# Patient Record
Sex: FEMALE | Race: BLACK OR AFRICAN AMERICAN | NOT HISPANIC OR LATINO | URBAN - METROPOLITAN AREA
[De-identification: names, ages, dates, MRNs, and addresses within clinical notes are randomized per-mention and may not be internally consistent; named-entity substitution may affect disease eponyms.]

---

## 2019-03-11 ENCOUNTER — INPATIENT (INPATIENT)
Facility: HOSPITAL | Age: 29
LOS: 0 days | Discharge: HOME | DRG: 812 | End: 2019-03-12
Attending: STUDENT IN AN ORGANIZED HEALTH CARE EDUCATION/TRAINING PROGRAM | Admitting: STUDENT IN AN ORGANIZED HEALTH CARE EDUCATION/TRAINING PROGRAM
Payer: COMMERCIAL

## 2019-03-11 VITALS
OXYGEN SATURATION: 98 % | HEART RATE: 102 BPM | TEMPERATURE: 98 F | SYSTOLIC BLOOD PRESSURE: 135 MMHG | RESPIRATION RATE: 18 BRPM | DIASTOLIC BLOOD PRESSURE: 84 MMHG

## 2019-03-11 DIAGNOSIS — Z91.89 OTHER SPECIFIED PERSONAL RISK FACTORS, NOT ELSEWHERE CLASSIFIED: ICD-10-CM

## 2019-03-11 DIAGNOSIS — D57.00 HB-SS DISEASE WITH CRISIS, UNSPECIFIED: ICD-10-CM

## 2019-03-11 DIAGNOSIS — Z29.9 ENCOUNTER FOR PROPHYLACTIC MEASURES, UNSPECIFIED: ICD-10-CM

## 2019-03-11 DIAGNOSIS — Z90.49 ACQUIRED ABSENCE OF OTHER SPECIFIED PARTS OF DIGESTIVE TRACT: Chronic | ICD-10-CM

## 2019-03-11 DIAGNOSIS — Z46.51 ENCOUNTER FOR FITTING AND ADJUSTMENT OF GASTRIC LAP BAND: Chronic | ICD-10-CM

## 2019-03-11 DIAGNOSIS — R94.31 ABNORMAL ELECTROCARDIOGRAM [ECG] [EKG]: ICD-10-CM

## 2019-03-11 DIAGNOSIS — D72.829 ELEVATED WHITE BLOOD CELL COUNT, UNSPECIFIED: ICD-10-CM

## 2019-03-11 LAB
ALBUMIN SERPL ELPH-MCNC: 4.1 G/DL — SIGNIFICANT CHANGE UP (ref 3.3–5)
ALP SERPL-CCNC: 78 U/L — SIGNIFICANT CHANGE UP (ref 40–120)
ALT FLD-CCNC: 23 U/L — SIGNIFICANT CHANGE UP (ref 10–45)
ANION GAP SERPL CALC-SCNC: 12 MMOL/L — SIGNIFICANT CHANGE UP (ref 5–17)
APPEARANCE UR: CLEAR — SIGNIFICANT CHANGE UP
AST SERPL-CCNC: 35 U/L — SIGNIFICANT CHANGE UP (ref 10–40)
BASOPHILS # BLD AUTO: 0 K/UL — SIGNIFICANT CHANGE UP (ref 0–0.2)
BASOPHILS NFR BLD AUTO: 0 % — SIGNIFICANT CHANGE UP (ref 0–2)
BILIRUB SERPL-MCNC: 1.2 MG/DL — SIGNIFICANT CHANGE UP (ref 0.2–1.2)
BILIRUB UR-MCNC: NEGATIVE — SIGNIFICANT CHANGE UP
BUN SERPL-MCNC: 12 MG/DL — SIGNIFICANT CHANGE UP (ref 7–23)
CALCIUM SERPL-MCNC: 9.1 MG/DL — SIGNIFICANT CHANGE UP (ref 8.4–10.5)
CHLORIDE SERPL-SCNC: 106 MMOL/L — SIGNIFICANT CHANGE UP (ref 96–108)
CO2 SERPL-SCNC: 24 MMOL/L — SIGNIFICANT CHANGE UP (ref 22–31)
COLOR SPEC: YELLOW — SIGNIFICANT CHANGE UP
CREAT SERPL-MCNC: 0.62 MG/DL — SIGNIFICANT CHANGE UP (ref 0.5–1.3)
DIFF PNL FLD: NEGATIVE — SIGNIFICANT CHANGE UP
EOSINOPHIL # BLD AUTO: 0.8 K/UL — HIGH (ref 0–0.5)
EOSINOPHIL NFR BLD AUTO: 4.3 % — SIGNIFICANT CHANGE UP (ref 0–6)
GLUCOSE SERPL-MCNC: 88 MG/DL — SIGNIFICANT CHANGE UP (ref 70–99)
GLUCOSE UR QL: NEGATIVE — SIGNIFICANT CHANGE UP
HCG UR QL: NEGATIVE — SIGNIFICANT CHANGE UP
HCT VFR BLD CALC: 28.7 % — LOW (ref 34.5–45)
HGB BLD-MCNC: 10.2 G/DL — LOW (ref 11.5–15.5)
KETONES UR-MCNC: NEGATIVE — SIGNIFICANT CHANGE UP
LEUKOCYTE ESTERASE UR-ACNC: NEGATIVE — SIGNIFICANT CHANGE UP
LYMPHOCYTES # BLD AUTO: 17.4 % — SIGNIFICANT CHANGE UP (ref 13–44)
LYMPHOCYTES # BLD AUTO: 3.26 K/UL — SIGNIFICANT CHANGE UP (ref 1–3.3)
MCHC RBC-ENTMCNC: 26.9 PG — LOW (ref 27–34)
MCHC RBC-ENTMCNC: 35.5 GM/DL — SIGNIFICANT CHANGE UP (ref 32–36)
MCV RBC AUTO: 75.7 FL — LOW (ref 80–100)
MONOCYTES # BLD AUTO: 1.63 K/UL — HIGH (ref 0–0.9)
MONOCYTES NFR BLD AUTO: 8.7 % — SIGNIFICANT CHANGE UP (ref 2–14)
NEUTROPHILS # BLD AUTO: 13.02 K/UL — HIGH (ref 1.8–7.4)
NEUTROPHILS NFR BLD AUTO: 68.7 % — SIGNIFICANT CHANGE UP (ref 43–77)
NITRITE UR-MCNC: NEGATIVE — SIGNIFICANT CHANGE UP
PH UR: 6 — SIGNIFICANT CHANGE UP (ref 5–8)
PLATELET # BLD AUTO: 392 K/UL — SIGNIFICANT CHANGE UP (ref 150–400)
POTASSIUM SERPL-MCNC: 4.1 MMOL/L — SIGNIFICANT CHANGE UP (ref 3.5–5.3)
POTASSIUM SERPL-SCNC: 4.1 MMOL/L — SIGNIFICANT CHANGE UP (ref 3.5–5.3)
PROT SERPL-MCNC: 7.8 G/DL — SIGNIFICANT CHANGE UP (ref 6–8.3)
PROT UR-MCNC: NEGATIVE MG/DL — SIGNIFICANT CHANGE UP
RAPID RVP RESULT: SIGNIFICANT CHANGE UP
RBC # BLD: 3.79 M/UL — LOW (ref 3.8–5.2)
RBC # BLD: 3.79 M/UL — LOW (ref 3.8–5.2)
RBC # FLD: 19.9 % — HIGH (ref 10.3–14.5)
RETICS #: 255.8 K/UL — HIGH (ref 25–125)
RETICS/RBC NFR: 6.8 % — HIGH (ref 0.5–2.5)
SODIUM SERPL-SCNC: 142 MMOL/L — SIGNIFICANT CHANGE UP (ref 135–145)
SP GR SPEC: 1.02 — SIGNIFICANT CHANGE UP (ref 1–1.03)
UROBILINOGEN FLD QL: 0.2 E.U./DL — SIGNIFICANT CHANGE UP
WBC # BLD: 18.71 K/UL — HIGH (ref 3.8–10.5)
WBC # FLD AUTO: 18.71 K/UL — HIGH (ref 3.8–10.5)

## 2019-03-11 PROCEDURE — 99222 1ST HOSP IP/OBS MODERATE 55: CPT | Mod: GC

## 2019-03-11 PROCEDURE — 71045 X-RAY EXAM CHEST 1 VIEW: CPT | Mod: 26

## 2019-03-11 PROCEDURE — 99285 EMERGENCY DEPT VISIT HI MDM: CPT

## 2019-03-11 RX ORDER — SODIUM CHLORIDE 9 MG/ML
1000 INJECTION INTRAMUSCULAR; INTRAVENOUS; SUBCUTANEOUS ONCE
Qty: 0 | Refills: 0 | Status: COMPLETED | OUTPATIENT
Start: 2019-03-11 | End: 2019-03-11

## 2019-03-11 RX ORDER — HEPARIN SODIUM 5000 [USP'U]/ML
5000 INJECTION INTRAVENOUS; SUBCUTANEOUS EVERY 8 HOURS
Qty: 0 | Refills: 0 | Status: DISCONTINUED | OUTPATIENT
Start: 2019-03-11 | End: 2019-03-12

## 2019-03-11 RX ORDER — HYDROMORPHONE HYDROCHLORIDE 2 MG/ML
1 INJECTION INTRAMUSCULAR; INTRAVENOUS; SUBCUTANEOUS ONCE
Qty: 0 | Refills: 0 | Status: DISCONTINUED | OUTPATIENT
Start: 2019-03-11 | End: 2019-03-11

## 2019-03-11 RX ORDER — KETOROLAC TROMETHAMINE 30 MG/ML
15 SYRINGE (ML) INJECTION EVERY 6 HOURS
Qty: 0 | Refills: 0 | Status: DISCONTINUED | OUTPATIENT
Start: 2019-03-11 | End: 2019-03-12

## 2019-03-11 RX ORDER — DIPHENHYDRAMINE HCL 50 MG
25 CAPSULE ORAL EVERY 6 HOURS
Qty: 0 | Refills: 0 | Status: DISCONTINUED | OUTPATIENT
Start: 2019-03-11 | End: 2019-03-12

## 2019-03-11 RX ORDER — FOLIC ACID 0.8 MG
1 TABLET ORAL
Qty: 0 | Refills: 0 | COMMUNITY

## 2019-03-11 RX ORDER — HYDROMORPHONE HYDROCHLORIDE 2 MG/ML
4 INJECTION INTRAMUSCULAR; INTRAVENOUS; SUBCUTANEOUS ONCE
Qty: 0 | Refills: 0 | Status: DISCONTINUED | OUTPATIENT
Start: 2019-03-11 | End: 2019-03-11

## 2019-03-11 RX ORDER — HYDROMORPHONE HYDROCHLORIDE 2 MG/ML
0.75 INJECTION INTRAMUSCULAR; INTRAVENOUS; SUBCUTANEOUS EVERY 4 HOURS
Qty: 0 | Refills: 0 | Status: DISCONTINUED | OUTPATIENT
Start: 2019-03-12 | End: 2019-03-12

## 2019-03-11 RX ORDER — KETOROLAC TROMETHAMINE 30 MG/ML
15 SYRINGE (ML) INJECTION ONCE
Qty: 0 | Refills: 0 | Status: DISCONTINUED | OUTPATIENT
Start: 2019-03-11 | End: 2019-03-11

## 2019-03-11 RX ORDER — SODIUM CHLORIDE 9 MG/ML
1000 INJECTION, SOLUTION INTRAVENOUS
Qty: 0 | Refills: 0 | Status: DISCONTINUED | OUTPATIENT
Start: 2019-03-11 | End: 2019-03-12

## 2019-03-11 RX ORDER — HYDROMORPHONE HYDROCHLORIDE 2 MG/ML
0 INJECTION INTRAMUSCULAR; INTRAVENOUS; SUBCUTANEOUS
Qty: 0 | Refills: 0 | COMMUNITY

## 2019-03-11 RX ORDER — FOLIC ACID 0.8 MG
1 TABLET ORAL DAILY
Qty: 0 | Refills: 0 | Status: DISCONTINUED | OUTPATIENT
Start: 2019-03-11 | End: 2019-03-12

## 2019-03-11 RX ORDER — DIPHENHYDRAMINE HCL 50 MG
25 CAPSULE ORAL ONCE
Qty: 0 | Refills: 0 | Status: COMPLETED | OUTPATIENT
Start: 2019-03-11 | End: 2019-03-11

## 2019-03-11 RX ADMIN — SODIUM CHLORIDE 1000 MILLILITER(S): 9 INJECTION INTRAMUSCULAR; INTRAVENOUS; SUBCUTANEOUS at 19:16

## 2019-03-11 RX ADMIN — SODIUM CHLORIDE 1000 MILLILITER(S): 9 INJECTION INTRAMUSCULAR; INTRAVENOUS; SUBCUTANEOUS at 11:44

## 2019-03-11 RX ADMIN — SODIUM CHLORIDE 1000 MILLILITER(S): 9 INJECTION INTRAMUSCULAR; INTRAVENOUS; SUBCUTANEOUS at 14:00

## 2019-03-11 RX ADMIN — Medication 15 MILLIGRAM(S): at 15:22

## 2019-03-11 RX ADMIN — HYDROMORPHONE HYDROCHLORIDE 4 MILLIGRAM(S): 2 INJECTION INTRAMUSCULAR; INTRAVENOUS; SUBCUTANEOUS at 13:58

## 2019-03-11 RX ADMIN — HYDROMORPHONE HYDROCHLORIDE 1 MILLIGRAM(S): 2 INJECTION INTRAMUSCULAR; INTRAVENOUS; SUBCUTANEOUS at 23:25

## 2019-03-11 RX ADMIN — HYDROMORPHONE HYDROCHLORIDE 1 MILLIGRAM(S): 2 INJECTION INTRAMUSCULAR; INTRAVENOUS; SUBCUTANEOUS at 20:37

## 2019-03-11 RX ADMIN — HYDROMORPHONE HYDROCHLORIDE 4 MILLIGRAM(S): 2 INJECTION INTRAMUSCULAR; INTRAVENOUS; SUBCUTANEOUS at 12:36

## 2019-03-11 RX ADMIN — HYDROMORPHONE HYDROCHLORIDE 4 MILLIGRAM(S): 2 INJECTION INTRAMUSCULAR; INTRAVENOUS; SUBCUTANEOUS at 13:43

## 2019-03-11 RX ADMIN — Medication 15 MILLIGRAM(S): at 11:58

## 2019-03-11 RX ADMIN — Medication 15 MILLIGRAM(S): at 15:07

## 2019-03-11 RX ADMIN — SODIUM CHLORIDE 110 MILLILITER(S): 9 INJECTION, SOLUTION INTRAVENOUS at 21:30

## 2019-03-11 RX ADMIN — Medication 15 MILLIGRAM(S): at 12:13

## 2019-03-11 RX ADMIN — SODIUM CHLORIDE 1000 MILLILITER(S): 9 INJECTION INTRAMUSCULAR; INTRAVENOUS; SUBCUTANEOUS at 14:04

## 2019-03-11 RX ADMIN — HYDROMORPHONE HYDROCHLORIDE 4 MILLIGRAM(S): 2 INJECTION INTRAMUSCULAR; INTRAVENOUS; SUBCUTANEOUS at 11:36

## 2019-03-11 RX ADMIN — Medication 25 MILLIGRAM(S): at 11:44

## 2019-03-11 NOTE — H&P ADULT - PROBLEM SELECTOR PLAN 1
Patient with SC disease since childhood; follows up with LINN benito at [facility]; Patient with hgb of 10.2 microcytic but currently has reticulocytosis on labs; similar to previous painful crisis; currently not on oxygen supplementation  -- will start dilaudid 0.75 mg/hr IV q4 prn in addition to toradol 15 mg Q6 PRN for pain control (Patient typically takes  this dose in flare)   -- start 0.45% Nacl 110 cc/hr for hydration   -- will obtain CXR to r/o ACS although low likelihood  -- folic acid 1 mg QD

## 2019-03-11 NOTE — H&P ADULT - NSHPLABSRESULTS_GEN_ALL_CORE
.  LABS:                         10.2   18.71 )-----------( 392      ( 11 Mar 2019 14:02 )             28.7     03-11    142  |  106  |  12  ----------------------------<  88  4.1   |  24  |  0.62    Ca    9.1      11 Mar 2019 14:02    TPro  7.8  /  Alb  4.1  /  TBili  1.2  /  DBili  x   /  AST  35  /  ALT  23  /  AlkPhos  78  03-11      Urinalysis Basic - ( 11 Mar 2019 19:26 )    Color: Yellow / Appearance: Clear / S.020 / pH: x  Gluc: x / Ketone: NEGATIVE  / Bili: Negative / Urobili: 0.2 E.U./dL   Blood: x / Protein: NEGATIVE mg/dL / Nitrite: NEGATIVE   Leuk Esterase: NEGATIVE / RBC: x / WBC x   Sq Epi: x / Non Sq Epi: x / Bacteria: x                RADIOLOGY, EKG & ADDITIONAL TESTS: Reviewed.

## 2019-03-11 NOTE — ED ADULT NURSE NOTE - NSIMPLEMENTINTERV_GEN_ALL_ED
Implemented All Universal Safety Interventions:  [place] to call system. Call bell, personal items and telephone within reach. Instruct patient to call for assistance. Room bathroom lighting operational. Non-slip footwear when patient is off stretcher. Physically safe environment: no spills, clutter or unnecessary equipment. Stretcher in lowest position, wheels locked, appropriate side rails in place.

## 2019-03-11 NOTE — ED PROVIDER NOTE - CLINICAL SUMMARY MEDICAL DECISION MAKING FREE TEXT BOX
[age] y/o female without no acute illness c/o b/l arm/leg and back pain that is characteristic of her sickle pain. No chest pain, sob, difficulty breathing or fever. Labs noted. Pt hydrated, pain treated. Reports improvement. Pt will follow-up with her hematologist Dr. Johnson to discuss possibly increasing her pain medication. Pt well-appearing and non-toxic. 27 y/o female without no acute illness c/o b/l arm/leg and back pain that is characteristic of her sickle pain. No chest pain, sob, difficulty breathing or fever. Labs noted. Pt hydrated, pain treated. Reports improvement. Pt will follow-up with her hematologist Dr. Johnson to discuss possibly increasing her pain medication. Pt well-appearing and non-toxic.    Due to ED policy - only PO Dilaudid authorized in ED while pt was requesting for IV Dilaudid. Pt expressed concern about additional PO Dilaudid. 27 y/o female without no acute illness c/o b/l arm/leg and back pain that is characteristic of her sickle pain. No chest pain, sob, difficulty breathing or fever. Labs noted. Pt hydrated, pain treated. Reports improvement. Pt will follow-up with her hematologist Dr. Johnson to discuss possibly increasing her pain medication. Pt well-appearing and non-toxic.    Due to ED policy - only PO Dilaudid authorized in ED while pt was requesting for IV Dilaudid. Pt expressed concern about additional PO Dilaudid. Admission for pain control and fluid hydration.

## 2019-03-11 NOTE — H&P ADULT - PROBLEM SELECTOR PLAN 2
Patient with WBC of 18; likely stress induced in setting of flare vs. hemoconcentration ; no localizing signs of infection;   -- obtain CXR to r/o infiltrates although low likelihood  -- continue to trend

## 2019-03-11 NOTE — ED PROVIDER NOTE - ATTENDING CONTRIBUTION TO CARE
29yo F hx of sickle cell dx, here w/ bilateral arm pain and upper back pain. I discussed the plan of care of the patient directly with the PA while the patient was in the Emergency Department. I did not perform a face to face diagnostic evaluation on this patient. I have reviewed the ACP note and agree with the history, exam and plan of care.

## 2019-03-11 NOTE — H&P ADULT - PROBLEM SELECTOR PLAN 4
F: 0.45% Saline at 110 cc/hr  E: replete PRN   N: DASH diet   Prophylactic measure: heparin subq as high risk for thrombosis

## 2019-03-11 NOTE — ED ADULT TRIAGE NOTE - CHIEF COMPLAINT QUOTE
"I'm having a sickle cell crisis."  c/o b/l arm and leg pain since yesterday.  Pt took 4mg Dilaudid and 800mg Ibuprofen at 3am today with no relief.  Pt states she had one episode of palpitations yesterday.

## 2019-03-11 NOTE — H&P ADULT - HISTORY OF PRESENT ILLNESS
In the ED, vitals were T(F): Max: 98.7 (11 Mar 2019 14:27)HR:90 - 102BP: 111/79 - 135/84RR: 16 - 18 SpO2: 98% - 100%. Patient given 2 l OF Patient is a 27 yo female with a pmhx of SC disease and morbid obesity who presents for signs and symptoms concerning for a sickle cell pain crisis. Patient reports for the past week she has been having pain in her shoulders and thighs similar to her previous crisis in the past (last in jan 2018). Patient was still able to function by medicating with ibuprofen (800 mg TID) and Dilaudid 4 mg PO (3-4 times daily). Yesterday the pain in her thighs got intense and she decided to present to the ED today. Patient reports she gets 3- 4 flares per year. Patient reports she has tried hydroxy urea in the past but had an adverse reaction so hasnt been on it. Of note, patient has had ACS decades ago but has never been intubated for it.       In the ED, vitals were T(F): Max: 98.7 (11 Mar 2019 14:27)HR:90 - 102BP: 111/79 - 135/84RR: 16 - 18 SpO2: 98% - 100%. Patient given 2 l NS, benadryl, dilaudid PO and 30 mg total of toradol Patient is a 27 yo female with a pmhx of SC disease and morbid obesity who presents for signs and symptoms concerning for a sickle cell pain crisis. Patient reports for the past week she has been having pain in her shoulders and thighs similar to her previous crisis in the past (last in jan 2018). 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 Patient reports she gets 3- 4 flares per year. Patient reports she has tried hydroxy urea in the past but had an adverse reaction so hasnt been on it. Of note, patient has had ACS decades ago but has never been intubated for it.       In the ED, vitals were T(F): Max: 98.7 (11 Mar 2019 14:27)HR:90 - 102BP: 111/79 - 135/84RR: 16 - 18 SpO2: 98% - 100%. Patient given 2 l NS, benadryl, dilaudid PO and 30 mg total of toradol

## 2019-03-11 NOTE — H&P ADULT - PSH
Encounter for adjustment of gastric lap band    History of appendectomy    History of cholecystectomy

## 2019-03-11 NOTE — ED ADULT NURSE NOTE - OBJECTIVE STATEMENT
Pt presents to ED states she is having a sickle cell crisis, c/o b/l arm and leg pain 8/10 on pain scale. Able to ambulate with pain. Also reports having palpitations yesterday. EKG done.

## 2019-03-11 NOTE — H&P ADULT - NSHPPHYSICALEXAM_GEN_ALL_CORE
.  VITAL SIGNS:  T(C): 37 (03-11-19 @ 17:10), Max: 37.1 (03-11-19 @ 14:27)  T(F): 98.6 (03-11-19 @ 17:10), Max: 98.7 (03-11-19 @ 14:27)  HR: 90 (03-11-19 @ 17:10) (90 - 102)  BP: 111/79 (03-11-19 @ 17:10) (111/79 - 135/84)  BP(mean): --  RR: 16 (03-11-19 @ 17:10) (16 - 18)  SpO2: 100% (03-11-19 @ 17:10) (98% - 100%)  Wt(kg): --    PHYSICAL EXAM:    Constitutional: WDWN resting comfortably in bed; NAD  Head: NC/AT  Eyes: PERRL, EOMI, clear conjunctiva  ENT: no nasal discharge; uvula midline, no oropharyngeal erythema or exudates; MMM  Neck: supple; no JVD or thyromegaly  Respiratory: CTA B/L; no W/R/R, no retractions  Cardiac: +S1/S2; RRR; no M/R/G; PMI non-displaced  Gastrointestinal: soft, NT/ND; no rebound or guarding; +BSx4  Genitourinary: normal external genitalia  Back: spine midline, no bony tenderness or step-offs; no CVAT B/L  Extremities: WWP, no clubbing or cyanosis; no peripheral edema  Musculoskeletal: NROM x4; no joint swelling, tenderness or erythema  Vascular: 2+ radial, femoral, DP/PT pulses B/L  Dermatologic: skin warm, dry and intact; no rashes, wounds, or scars  Lymphatic: no submandibular or cervical LAD  Neurologic: AAOx3; CNII-XII grossly intact; no focal deficits  Psychiatric: affect and characteristics of appearance, verbalizations, behaviors are appropriate .  VITAL SIGNS:  T(C): 37 (03-11-19 @ 17:10), Max: 37.1 (03-11-19 @ 14:27)  T(F): 98.6 (03-11-19 @ 17:10), Max: 98.7 (03-11-19 @ 14:27)  HR: 90 (03-11-19 @ 17:10) (90 - 102)  BP: 111/79 (03-11-19 @ 17:10) (111/79 - 135/84)  BP(mean): --  RR: 16 (03-11-19 @ 17:10) (16 - 18)  SpO2: 100% (03-11-19 @ 17:10) (98% - 100%)  Wt(kg): --    PHYSICAL EXAM:    Constitutional: WDWN resting comfortably in bed; obesity; in discomfort   Head: NC/AT  Eyes: PERRL, EOMI, clear conjunctiva  ENT: no nasal discharge; uvula midline, no oropharyngeal erythema or exudates; MMM  Neck: supple; no JVD   Respiratory: CTA B/L however diminshed due to poor effort   Cardiac: +S1/S2; RRR; no M/R/G;  Gastrointestinal: soft, NT/ND; no rebound or guarding; +BS  Extremities: WWP, no clubbing or cyanosis; no peripheral edema  Musculoskeletal: Tenderness to palpation in the anterior thighs bilaterally NROM x4;   Vascular: 2+ radial pulses B/L  Dermatologic: skin warm, dry and intact; no rashes, wounds, or scars  Neurologic: AAOx3; CNII-XII grossly intact; no focal deficits  Psychiatric: affect and characteristics of appearance, verbalizations, behaviors are appropriate

## 2019-03-11 NOTE — ED PROVIDER NOTE - OBJECTIVE STATEMENT
29 y/o female with a PMHx of SSD is present in the ED with b/l arm pain and upper back pain x3 days. Pt reports pain feels like an ache and is similar to her previous acute sickle pain. She took motrin and Dilaudid at home last night for her pain without alleviation. She denies the following: fever, chills, nausea/vomiting, sob, chest pain, difficulty breathing, numbness/tingling of the extremities, fall/trauma. Pt has a hematologist. Pt states she has had acute chest syndrome in the past and current pain is "nothing like that".

## 2019-03-11 NOTE — H&P ADULT - ASSESSMENT
Patient is a 29 yo female with a pmhx of SC disease and morbid obesity who presents for signs and symptoms concerning for a sickle cell pain crisis

## 2019-03-11 NOTE — H&P ADULT - NSHPREVIEWOFSYSTEMS_GEN_ALL_CORE

## 2019-03-11 NOTE — ED PROVIDER NOTE - NSFOLLOWUPINSTRUCTIONS_ED_ALL_ED_FT
Sickle Cell Crisis    WHAT YOU NEED TO KNOW:    A sickle cell crisis is a painful episode that occurs in people who have sickle cell anemia. It happens when sickle-shaped red blood cells (RBCs) block blood vessels. Blood and oxygen cannot get to tissues, causing pain. A sickle cell crisis can also damage your tissues and cause organ failure, such liver or kidney failure. A sickle cell crisis can become life-threatening.    DISCHARGE INSTRUCTIONS:    Call 911 for any of the following:     You have shortness of breath or chest pain.      You are a man and have an erection that is painful and does not go away.       You lose vision in one or both eyes.    Return to the emergency department if:     You have a fever.      You feel like you cannot cope with your pain, or you feel like hurting yourself.       You have behavior changes, a seizure, or faint.       You have abdominal pain, nausea, or vomiting.      You have a headache that is worse or different from those that you have had in the past.       You have new weakness or numbness in your arm, leg, or face.      You have new pain in any part of your body.      Your urine is dark and you are urinating less than usual or not at all.       You are dizzy, lightheaded, or faint.     Contact your healthcare provider if:     You have any new signs or symptoms.       You have blood in your urine.       You are constipated or you have diarrhea.       You have changes in your vision.       You have increased fatigue.       You plan to travel by airplane or to a high elevation.       You have questions or concerns about your condition or care.    Medicines: You may need any of the following:     Prescription pain medicine may be given. Ask how to take this medicine safely. Medicine may also be given to decrease sickling of your RBCs. You may also need medicine to treat or prevent a bacterial infection.       NSAIDs, such as ibuprofen, help decrease swelling, pain, and fever. This medicine is available with or without a doctor's order. NSAIDs can cause stomach bleeding or kidney problems in certain people. If you take blood thinner medicine, always ask your healthcare provider if NSAIDs are safe for you. Always read the medicine label and follow directions.      Acetaminophen decreases pain and fever. It is available without a doctor's order. Ask how much to take and how often to take it. Follow directions. Acetaminophen can cause liver damage if not taken correctly.      Take your medicine as directed. Contact your healthcare provider if you think your medicine is not helping or if you have side effects. Tell him or her if you are allergic to any medicine. Keep a list of the medicines, vitamins, and herbs you take. Include the amounts, and when and why you take them. Bring the list or the pill bottles to follow-up visits. Carry your medicine list with you in case of an emergency.    Medical alert identification: Wear medical alert jewelry or carry a card that says you have sickle cell anemia. Ask your healthcare provider where to get these items. Medical Alert Jewelry         Prevent a sickle cell crisis:     Take vitamins and minerals as directed. Folic acid can help prevent blood vessel problems that can occur with sickle cell anemia. Zinc may decrease how often you have pain.       Drink liquids as directed. Dehydration can increase your risk for a sickle cell crisis. Ask how much liquid to drink each day and which liquids are best for you.      Balance rest and exercise. Rest during a sickle cell crisis. Over time, increase your activity to a moderate amount. Exercise regularly. Avoid exercise or activities that can cause injury, such as football. Ask about the best exercise plan for you.       Stay out of the cold. Do not go quickly from a warm place to a cold place. Do not go swimming in cold water. Stay warm in the winter.      Do not smoke cigarettes or drink alcohol. These increase your risk for a sickle cell crisis. Nicotine and other chemicals in cigarettes and cigars can cause lung damage. Ask your healthcare provider for information if you currently smoke and need help to quit. E-cigarettes or smokeless tobacco still contain nicotine. Talk to your healthcare provider before you use these products.       Ask about vaccinations you need. Vaccinations can help prevent a viral infection that may lead to a sickle cell crisis. Get a flu shot every year as directed. You may need a pneumonia vaccine every 5 years.     Follow up with your healthcare provider as directed: You may need ongoing screening for conditions that can develop because of sickle cell disease. Examples include kidney disease, hypertension (high blood pressure), retinopathy (eye problems), and problems with your lungs. Write down your questions so you remember to ask them during your visits.

## 2019-03-11 NOTE — H&P ADULT - PROBLEM SELECTOR PLAN 3
Patient with RBBB on ECG- may be secondary to Pulm HTN from sickle cell disease  -- obtain collateral from Outpatient doctor regarding previous ECG;s

## 2019-03-11 NOTE — H&P ADULT - PROBLEM SELECTOR PLAN 5
1) PCP Contacted on Admission: (Y/N) --> DELANO benito NP  Name & Phone #:  2) Date of Contact with PCP:  3) PCP Contacted at Discharge: (Y/N, N/A)  4) Summary of Handoff Given to PCP:   5) Post-Discharge Appointment Date and Location: 1) PCP Contacted on Admission: (Y/N) --> Dr Gerardo reyes/Lynnette benito NP- [facility] [phone] NP  Name & Phone #:  2) Date of Contact with PCP:  3) PCP Contacted at Discharge: (Y/N, N/A)  4) Summary of Handoff Given to PCP:   5) Post-Discharge Appointment Date and Location:

## 2019-03-12 ENCOUNTER — TRANSCRIPTION ENCOUNTER (OUTPATIENT)
Age: 29
End: 2019-03-12

## 2019-03-12 VITALS
TEMPERATURE: 98 F | SYSTOLIC BLOOD PRESSURE: 142 MMHG | WEIGHT: 255.52 LBS | DIASTOLIC BLOOD PRESSURE: 83 MMHG | HEART RATE: 88 BPM | OXYGEN SATURATION: 97 % | RESPIRATION RATE: 16 BRPM | HEIGHT: 66 IN

## 2019-03-12 LAB
ANION GAP SERPL CALC-SCNC: 8 MMOL/L — SIGNIFICANT CHANGE UP (ref 5–17)
BUN SERPL-MCNC: 9 MG/DL — SIGNIFICANT CHANGE UP (ref 7–23)
CALCIUM SERPL-MCNC: 9.2 MG/DL — SIGNIFICANT CHANGE UP (ref 8.4–10.5)
CHLORIDE SERPL-SCNC: 103 MMOL/L — SIGNIFICANT CHANGE UP (ref 96–108)
CO2 SERPL-SCNC: 26 MMOL/L — SIGNIFICANT CHANGE UP (ref 22–31)
CREAT SERPL-MCNC: 0.61 MG/DL — SIGNIFICANT CHANGE UP (ref 0.5–1.3)
GLUCOSE SERPL-MCNC: 87 MG/DL — SIGNIFICANT CHANGE UP (ref 70–99)
HCT VFR BLD CALC: 28.1 % — LOW (ref 34.5–45)
HGB BLD-MCNC: 9.8 G/DL — LOW (ref 11.5–15.5)
MAGNESIUM SERPL-MCNC: 1.8 MG/DL — SIGNIFICANT CHANGE UP (ref 1.6–2.6)
MCHC RBC-ENTMCNC: 26.5 PG — LOW (ref 27–34)
MCHC RBC-ENTMCNC: 34.9 GM/DL — SIGNIFICANT CHANGE UP (ref 32–36)
MCV RBC AUTO: 75.9 FL — LOW (ref 80–100)
NRBC # BLD: 0 /100 WBCS — SIGNIFICANT CHANGE UP (ref 0–0)
PHOSPHATE SERPL-MCNC: 4.4 MG/DL — SIGNIFICANT CHANGE UP (ref 2.5–4.5)
PLATELET # BLD AUTO: 335 K/UL — SIGNIFICANT CHANGE UP (ref 150–400)
POTASSIUM SERPL-MCNC: 3.8 MMOL/L — SIGNIFICANT CHANGE UP (ref 3.5–5.3)
POTASSIUM SERPL-SCNC: 3.8 MMOL/L — SIGNIFICANT CHANGE UP (ref 3.5–5.3)
RBC # BLD: 3.7 M/UL — LOW (ref 3.8–5.2)
RBC # FLD: 19.5 % — HIGH (ref 10.3–14.5)
SODIUM SERPL-SCNC: 137 MMOL/L — SIGNIFICANT CHANGE UP (ref 135–145)
WBC # BLD: 14.12 K/UL — HIGH (ref 3.8–10.5)
WBC # FLD AUTO: 14.12 K/UL — HIGH (ref 3.8–10.5)

## 2019-03-12 PROCEDURE — 83735 ASSAY OF MAGNESIUM: CPT

## 2019-03-12 PROCEDURE — 80053 COMPREHEN METABOLIC PANEL: CPT

## 2019-03-12 PROCEDURE — 85045 AUTOMATED RETICULOCYTE COUNT: CPT

## 2019-03-12 PROCEDURE — 84100 ASSAY OF PHOSPHORUS: CPT

## 2019-03-12 PROCEDURE — 71045 X-RAY EXAM CHEST 1 VIEW: CPT

## 2019-03-12 PROCEDURE — 87486 CHLMYD PNEUM DNA AMP PROBE: CPT

## 2019-03-12 PROCEDURE — 80048 BASIC METABOLIC PNL TOTAL CA: CPT

## 2019-03-12 PROCEDURE — 36415 COLL VENOUS BLD VENIPUNCTURE: CPT

## 2019-03-12 PROCEDURE — 99285 EMERGENCY DEPT VISIT HI MDM: CPT | Mod: 25

## 2019-03-12 PROCEDURE — 96376 TX/PRO/DX INJ SAME DRUG ADON: CPT

## 2019-03-12 PROCEDURE — 99239 HOSP IP/OBS DSCHRG MGMT >30: CPT

## 2019-03-12 PROCEDURE — 87581 M.PNEUMON DNA AMP PROBE: CPT

## 2019-03-12 PROCEDURE — 96374 THER/PROPH/DIAG INJ IV PUSH: CPT

## 2019-03-12 PROCEDURE — 87633 RESP VIRUS 12-25 TARGETS: CPT

## 2019-03-12 PROCEDURE — 81025 URINE PREGNANCY TEST: CPT

## 2019-03-12 PROCEDURE — 85027 COMPLETE CBC AUTOMATED: CPT

## 2019-03-12 PROCEDURE — 96361 HYDRATE IV INFUSION ADD-ON: CPT

## 2019-03-12 PROCEDURE — 85025 COMPLETE CBC W/AUTO DIFF WBC: CPT

## 2019-03-12 PROCEDURE — 87798 DETECT AGENT NOS DNA AMP: CPT

## 2019-03-12 PROCEDURE — 81003 URINALYSIS AUTO W/O SCOPE: CPT

## 2019-03-12 RX ORDER — POTASSIUM CHLORIDE 20 MEQ
20 PACKET (EA) ORAL ONCE
Qty: 0 | Refills: 0 | Status: COMPLETED | OUTPATIENT
Start: 2019-03-12 | End: 2019-03-12

## 2019-03-12 RX ORDER — IBUPROFEN 200 MG
1 TABLET ORAL
Qty: 120 | Refills: 1 | OUTPATIENT
Start: 2019-03-12 | End: 2019-05-10

## 2019-03-12 RX ORDER — IBUPROFEN 200 MG
0 TABLET ORAL
Qty: 0 | Refills: 0 | COMMUNITY

## 2019-03-12 RX ORDER — HYDROMORPHONE HYDROCHLORIDE 2 MG/ML
1 INJECTION INTRAMUSCULAR; INTRAVENOUS; SUBCUTANEOUS
Qty: 20 | Refills: 0 | OUTPATIENT
Start: 2019-03-12 | End: 2019-03-16

## 2019-03-12 RX ADMIN — HYDROMORPHONE HYDROCHLORIDE 0.75 MILLIGRAM(S): 2 INJECTION INTRAMUSCULAR; INTRAVENOUS; SUBCUTANEOUS at 10:33

## 2019-03-12 RX ADMIN — HYDROMORPHONE HYDROCHLORIDE 0.75 MILLIGRAM(S): 2 INJECTION INTRAMUSCULAR; INTRAVENOUS; SUBCUTANEOUS at 18:25

## 2019-03-12 RX ADMIN — HYDROMORPHONE HYDROCHLORIDE 0.75 MILLIGRAM(S): 2 INJECTION INTRAMUSCULAR; INTRAVENOUS; SUBCUTANEOUS at 14:23

## 2019-03-12 RX ADMIN — Medication 15 MILLIGRAM(S): at 00:53

## 2019-03-12 RX ADMIN — Medication 20 MILLIEQUIVALENT(S): at 11:41

## 2019-03-12 RX ADMIN — HYDROMORPHONE HYDROCHLORIDE 0.75 MILLIGRAM(S): 2 INJECTION INTRAMUSCULAR; INTRAVENOUS; SUBCUTANEOUS at 07:37

## 2019-03-12 RX ADMIN — Medication 15 MILLIGRAM(S): at 18:31

## 2019-03-12 RX ADMIN — SODIUM CHLORIDE 110 MILLILITER(S): 9 INJECTION, SOLUTION INTRAVENOUS at 06:48

## 2019-03-12 RX ADMIN — HYDROMORPHONE HYDROCHLORIDE 0.75 MILLIGRAM(S): 2 INJECTION INTRAMUSCULAR; INTRAVENOUS; SUBCUTANEOUS at 10:18

## 2019-03-12 RX ADMIN — Medication 1 MILLIGRAM(S): at 11:41

## 2019-03-12 RX ADMIN — HYDROMORPHONE HYDROCHLORIDE 0.75 MILLIGRAM(S): 2 INJECTION INTRAMUSCULAR; INTRAVENOUS; SUBCUTANEOUS at 00:59

## 2019-03-12 RX ADMIN — HYDROMORPHONE HYDROCHLORIDE 0.75 MILLIGRAM(S): 2 INJECTION INTRAMUSCULAR; INTRAVENOUS; SUBCUTANEOUS at 14:38

## 2019-03-12 RX ADMIN — Medication 15 MILLIGRAM(S): at 07:37

## 2019-03-12 NOTE — DISCHARGE NOTE PROVIDER - CARE PROVIDERS DIRECT ADDRESSES
,DirectAddress_Unknown ,[email or web address],DirectAddress_Unknown

## 2019-03-12 NOTE — DISCHARGE NOTE PROVIDER - CARE PROVIDER_API CALL
Mehrdad Mclaughlin  [facility] [facility] [place] [address]  Phone: [phone]  Fax: (   )    -  Follow Up Time: 2 weeks Taylor Laura)  Internal Medicine  [address] [place] [address]  Phone: [phone]  Fax: [phone]  Follow Up Time: 2 weeks    Mehrdad Mclaughlin  [facility] [place] [address]  Phone: [phone]  Fax: (   )    -  Follow Up Time: 2 weeks

## 2019-03-12 NOTE — DISCHARGE NOTE PROVIDER - NSDCFUADDINST_GEN_ALL_CORE_FT
Please follow up with Dr. Mclaughlin, your hematologist, at your scheduled appointment on Thursday 3/21. His office will call you to clarify the time for the appointment. Please follow up with Dr. Mclaughlin, your hematologist, at your scheduled appointment on Thursday 3/21. His office will call you to clarify the time for the appointment.  You also have an appointment with one of our hematologists, Dr. Laura at your scheduled appointment on Tuesday 4/1 at 3pm. You may choose to follow up with Dr. Laura going forward.

## 2019-03-12 NOTE — DISCHARGE NOTE PROVIDER - NSDCCPCAREPLAN_GEN_ALL_CORE_FT
PRINCIPAL DISCHARGE DIAGNOSIS  Problem: Sickle cell pain crisis  Assessment and Plan of Treatment: You came to the hospital because you had a sick cell pain crisis. You were given IV fluids and pain medication to help resolve this episode. You will be discharged home with oral pain medication. Please follow up with your hematologist, Dr. Mclaughlin, to ensure that you are recovering appropriately. We recommend that you also discuss a re-trial of Hydroxyurea to help prevent such frequent pain crises. You may not have the same poor reaction that you had previously had with this medication. PRINCIPAL DISCHARGE DIAGNOSIS  Problem: Sickle cell pain crisis  Assessment and Plan of Treatment: You came to the hospital because you had a sick cell pain crisis. You were given IV fluids and pain medication to help resolve this episode. You will be discharged home with oral pain medication. Please follow up with your hematologist, Dr. Mclaughlin, or one of our hematologists, Dr. Laura, to ensure that you are recovering appropriately. We recommend that you also discuss a re-trial of Hydroxyurea to help prevent such frequent pain crises. You may not have the same poor reaction that you had previously had with this medication. PRINCIPAL DISCHARGE DIAGNOSIS  Problem: Sickle cell pain crisis  Assessment and Plan of Treatment: You came to the hospital because you had a sick cell pain crisis. You were given IV fluids and pain medication to help resolve this episode. You will be discharged home with oral pain medication. Please follow up with your hematologist, Dr. Mclaughlin, or one of our hematologists, Dr. Laura, to ensure that you are recovering appropriately. We recommend that you also discuss a re-trial of Hydroxyurea to help prevent such frequent pain crises. You may not have the same poor reaction that you had previously had with this medication.      SECONDARY DISCHARGE DIAGNOSES  Problem: Mediastinal lymphadenopathy  Assessment and Plan of Treatment: You were found to have possible enlargement of your lymph nodes near your lungs on your chest X-ray. This is a finding that should be discussed further with your hematologist when you follow up with them. Please be sure to mention this finding during your follow-up appointment with either Dr. Mclaughlin or Dr. Laura, or both.

## 2019-03-12 NOTE — DISCHARGE NOTE NURSING/CASE MANAGEMENT/SOCIAL WORK - NSDCDPATPORTLINK_GEN_ALL_CORE
You can access the [facility] Portal, offered by [facility], by registering with the following website: [email or web address]

## 2019-03-12 NOTE — DISCHARGE NOTE PROVIDER - HOSPITAL COURSE
29 yo female with a pmhx of SC disease and morbid obesity who presents for signs and symptoms concerning for a sickle cell pain crisis. Patient reports for the past week she has been having pain in her shoulders and thighs similar to her previous crisis in the past (last in jan 2018). 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 Patient reports she gets 3- 4 flares per year. Patient reports she has tried hydroxy urea in the past but had an adverse reaction (syncope, lethargy) so hasn't been on it. Of note, patient has had ACS decades ago but has never been intubated for it. In the ED, vitals were T(F): Max: 98.7 (11 Mar 2019 14:27)HR:90 - 102BP: 111/79 - 135/84RR: 16 - 18 SpO2: 98% - 100%. Patient given 2 l NS, benadryl, dilaudid PO and 30 mg total of toradol. CXR clear. Pt with significantly improved pain after IV pain medication and IV fluids, felt well enough to request to be discharged 3/12. 27 yo female with a pmhx of SC disease and morbid obesity who presents for signs and symptoms concerning for a sickle cell pain crisis. Patient reports for the past week she has been having pain in her shoulders and thighs similar to her previous crisis in the past (last in jan 2018). 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 Patient reports she gets 3- 4 flares per year. Patient reports she has tried hydroxy urea in the past but had an adverse reaction (syncope, lethargy) so hasn't been on it. Of note, patient has had ACS decades ago but has never been intubated for it. In the ED, vitals were T(F): Max: 98.7 (11 Mar 2019 14:27)HR:90 - 102BP: 111/79 - 135/84RR: 16 - 18 SpO2: 98% - 100%. Patient given 2 l NS, benadryl, dilaudid PO and 30 mg total of toradol. CXR clear. Pt with significantly improved pain after IV pain medication and IV fluids, felt well enough to request to be discharged 3/12. Of note, the patient was found to have a widened mediastinum on CXR possibly concerning for mediastinal lymphadenopathy. Per Heme/Onc, this has been present on previous imaging and she has been referred to an oncologist in the past, but has not followed up. 29 yo female with a pmhx of SC disease and morbid obesity who presents for signs and symptoms concerning for a sickle cell pain crisis. Patient reports for the past week she has been having pain in her shoulders and thighs similar to her previous crisis in the past (last in jan 2018). 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 Patient reports she gets 3- 4 flares per year. Patient reports she has tried hydroxy urea in the past but had an adverse reaction (syncope, lethargy) so hasn't been on it. Of note, patient has had ACS decades ago but has never been intubated for it. In the ED, vitals were T(F): Max: 98.7 (11 Mar 2019 14:27)HR:90 - 102BP: 111/79 - 135/84RR: 16 - 18 SpO2: 98% - 100%. Patient given 2 l NS, benadryl, dilaudid PO and 30 mg total of toradol. CXR clear, but appears to have mediastinal widening, which is consistent with a prior PET scan (+) medistinal lymphadenopathy, and splenomegaly, which is suspicious for lymphoma, to which her sickle cell providers (dr salinas reyes and beth benito NP -[facility]) are aware and has referred her to see oncology (dr zhanna coleman) in the recent past, however pt did not go to the appointment---this information was provided by Beth Benito NP at [facility] sickle cell clinic.     Pt with significantly improved pain after IV pain medication and IV fluids, felt well enough to request to be discharged 3/12.

## 2019-03-12 NOTE — DISCHARGE NOTE PROVIDER - PROVIDER TOKENS
FREE:[LAST:[Arnoldo],FIRST:[Mehrdad],PHONE:[phone]],FAX:[(   )    -],ADDRESS:[[address] [place] [address]],FOLLOWUP:[2 weeks]] PROVIDER:[TOKEN:[[id number]],FOLLOWUP:[2 weeks]],FREE:[LAST:[Arnoldo],FIRST:[Mehrdad],PHONE:[phone]:[(   )    -],ADDRESS:[[address] [place] [address]],FOLLOWUP:[2 weeks]]

## 2019-03-13 PROBLEM — D57.20 SICKLE-CELL/HB-C DISEASE WITHOUT CRISIS: Chronic | Status: ACTIVE | Noted: 2019-03-11

## 2019-03-13 PROBLEM — Z00.00 ENCOUNTER FOR PREVENTIVE HEALTH EXAMINATION: Status: ACTIVE | Noted: 2019-03-13

## 2019-03-15 DIAGNOSIS — E66.01 MORBID (SEVERE) OBESITY DUE TO EXCESS CALORIES: ICD-10-CM

## 2019-03-15 DIAGNOSIS — R16.1 SPLENOMEGALY, NOT ELSEWHERE CLASSIFIED: ICD-10-CM

## 2019-03-15 DIAGNOSIS — Z90.49 ACQUIRED ABSENCE OF OTHER SPECIFIED PARTS OF DIGESTIVE TRACT: ICD-10-CM

## 2019-03-15 DIAGNOSIS — R59.0 LOCALIZED ENLARGED LYMPH NODES: ICD-10-CM

## 2019-03-15 DIAGNOSIS — I45.10 UNSPECIFIED RIGHT BUNDLE-BRANCH BLOCK: ICD-10-CM

## 2019-03-15 DIAGNOSIS — D57.00 HB-SS DISEASE WITH CRISIS, UNSPECIFIED: ICD-10-CM

## 2019-03-15 DIAGNOSIS — D72.829 ELEVATED WHITE BLOOD CELL COUNT, UNSPECIFIED: ICD-10-CM

## 2019-04-02 ENCOUNTER — APPOINTMENT (OUTPATIENT)
Dept: HEMATOLOGY ONCOLOGY | Facility: CLINIC | Age: 29
End: 2019-04-02

## 2021-04-14 ENCOUNTER — RESULT REVIEW (OUTPATIENT)
Age: 31
End: 2021-04-14

## 2021-06-17 ENCOUNTER — RESULT REVIEW (OUTPATIENT)
Age: 31
End: 2021-06-17

## 2023-05-10 NOTE — PATIENT PROFILE ADULT - NSPROGENARRIVEDFROM_GEN_A_NUR
home [facility] Counseling: I discussed with the patient the risks of tralokinumab including but not limited to eye infection and irritation, cold sores, injection site reactions, worsening of asthma, allergic reactions and increased risk of parasitic infection.  Live vaccines should be avoided while taking tralokinumab. The patient understands that monitoring is required and they must alert us or the primary physician if symptoms of infection or other concerning signs are noted.

## 2024-10-16 NOTE — ED ADULT TRIAGE NOTE - NS ED NOTE AC HIGH RISK COUNTRIES
[facility], [address] [place] [address]    Nurse Only    5 months ago Other fatigue    [facility], [address] [place] [address]    Nurse Only    5 months ago Other fatigue    [facility], [address] [place], [place] [address]El Christine M., PA-C    Office Visit    1 year ago Snoring    [facility] [place]    Office Visit    1 year ago Routine general medical examination at a health care facility    [facility] [address] [place], [place] [address]El Christine M., PA-C    Office Visit          
No